# Patient Record
Sex: FEMALE | Race: WHITE
[De-identification: names, ages, dates, MRNs, and addresses within clinical notes are randomized per-mention and may not be internally consistent; named-entity substitution may affect disease eponyms.]

---

## 2017-01-27 ENCOUNTER — HOSPITAL ENCOUNTER (EMERGENCY)
Dept: HOSPITAL 17 - NEPC | Age: 27
Discharge: HOME | End: 2017-01-27
Payer: COMMERCIAL

## 2017-01-27 VITALS — HEIGHT: 66 IN | BODY MASS INDEX: 44.29 KG/M2 | WEIGHT: 275.58 LBS

## 2017-01-27 VITALS
DIASTOLIC BLOOD PRESSURE: 84 MMHG | TEMPERATURE: 98 F | RESPIRATION RATE: 15 BRPM | HEART RATE: 84 BPM | OXYGEN SATURATION: 98 % | SYSTOLIC BLOOD PRESSURE: 164 MMHG

## 2017-01-27 DIAGNOSIS — Z86.59: ICD-10-CM

## 2017-01-27 DIAGNOSIS — Z87.19: ICD-10-CM

## 2017-01-27 DIAGNOSIS — Z86.718: ICD-10-CM

## 2017-01-27 DIAGNOSIS — Z86.2: ICD-10-CM

## 2017-01-27 DIAGNOSIS — Z79.01: ICD-10-CM
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
## 2017-01-27 DIAGNOSIS — Z87.42: ICD-10-CM

## 2017-01-27 DIAGNOSIS — Z87.442: ICD-10-CM

## 2017-01-27 DIAGNOSIS — M62.830: Primary | ICD-10-CM

## 2017-01-27 DIAGNOSIS — Z87.39: ICD-10-CM

## 2017-01-27 DIAGNOSIS — Z86.79: ICD-10-CM

## 2017-01-27 DIAGNOSIS — Z87.09: ICD-10-CM

## 2017-01-27 PROCEDURE — 93005 ELECTROCARDIOGRAM TRACING: CPT

## 2017-01-27 PROCEDURE — 71020: CPT

## 2017-01-27 NOTE — EKG
Date Performed: 01/27/2017       Time Performed: 09:27:03

 

PTAGE:      26 years

 

EKG:      Sinus rhythm 

 

 NORMAL ECG

 

PREVIOUS TRACING       : 01/23/2014 22.03 Compared to prior tracing no significant change

 

DOCTOR:   Kuldeep Chapa  Interpretating Date/Time  01/27/2017 16:11:27
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
## 2017-03-25 ENCOUNTER — HOSPITAL ENCOUNTER (EMERGENCY)
Dept: HOSPITAL 17 - NEPC | Age: 27
Discharge: HOME | End: 2017-03-25
Payer: MEDICAID

## 2017-03-25 VITALS
SYSTOLIC BLOOD PRESSURE: 140 MMHG | OXYGEN SATURATION: 98 % | HEART RATE: 78 BPM | DIASTOLIC BLOOD PRESSURE: 94 MMHG | TEMPERATURE: 98.2 F | RESPIRATION RATE: 16 BRPM

## 2017-03-25 VITALS — SYSTOLIC BLOOD PRESSURE: 110 MMHG | DIASTOLIC BLOOD PRESSURE: 55 MMHG

## 2017-03-25 VITALS — HEIGHT: 66.5 IN | WEIGHT: 271.17 LBS | BODY MASS INDEX: 43.07 KG/M2

## 2017-03-25 DIAGNOSIS — R51: ICD-10-CM

## 2017-03-25 DIAGNOSIS — Z86.79: ICD-10-CM

## 2017-03-25 DIAGNOSIS — Z86.718: ICD-10-CM

## 2017-03-25 DIAGNOSIS — Z87.448: ICD-10-CM

## 2017-03-25 DIAGNOSIS — Z87.39: ICD-10-CM

## 2017-03-25 DIAGNOSIS — J40: Primary | ICD-10-CM

## 2017-03-25 DIAGNOSIS — Z86.2: ICD-10-CM

## 2017-03-25 DIAGNOSIS — Z86.59: ICD-10-CM

## 2017-03-25 DIAGNOSIS — Z87.09: ICD-10-CM

## 2017-03-25 DIAGNOSIS — Z79.01: ICD-10-CM

## 2017-03-25 DIAGNOSIS — Z86.69: ICD-10-CM

## 2017-03-25 DIAGNOSIS — Z87.891: ICD-10-CM

## 2017-03-25 DIAGNOSIS — Z87.19: ICD-10-CM

## 2017-03-25 PROCEDURE — 71010: CPT

## 2017-03-25 PROCEDURE — 94664 DEMO&/EVAL PT USE INHALER: CPT

## 2017-03-25 PROCEDURE — 99283 EMERGENCY DEPT VISIT LOW MDM: CPT

## 2017-03-25 NOTE — RADRPT
EXAM DATE/TIME:  03/25/2017 22:52 

 

[facility] COMPARISON:     

No previous studies available for comparison.

 

                     

INDICATIONS :     

Shortness of breath.

                     

 

MEDICAL HISTORY :            

Asthma   

 

SURGICAL HISTORY :     

None.   

 

ENCOUNTER:     

Initial                                        

 

ACUITY:     

1 day      

 

PAIN SCORE:     

0/10

 

LOCATION:     

Bilateral  chest

 

FINDINGS:     

A single view of the chest demonstrates the lungs to be symmetrically aerated without evidence of mas
s, infiltrate or effusion.  The cardiomediastinal contours are unremarkable.  Osseous structures are 
intact.

 

CONCLUSION:     Normal examination.  

 

 

 

 Jesús Pickett Jr., MD on March 25, 2017 at 23:04           

Board Certified Radiologist.

 This report was verified electronically.

## 2017-03-25 NOTE — PD
HPI


Chief Complaint:  Respiratory Symptoms


Time Seen by Provider:  22:28


Travel History


International Travel<30 days:  No


Contact w/Intl Traveler<30days:  No


Traveled to known affect area:  No





History of Present Illness


HPI


26-year-old female complains of coughing and shortness of breath.  Patient has 

history of asthma and has been using inhaler at home.  Patient states the cough 

is persistent cough and nonproductive.  Patient states that she has mild aching 

headache.  Patient denies earaches or sore throat.  Patient denies any chest 

pain.  Patient denies abdominal pain.  Patient denies any nausea vomiting 

diarrhea.  Patient denies any fever chills.





PFSH


Past Medical History


Hx Anticoagulant Therapy:  Yes (FACTOR 5)


ADHD:  Yes


Anemia:  Yes


Arthritis:  Yes (RA IN RIGHT LEG AND HIP)


Asthma:  Yes


Blood Disorders:  Yes (Factor V , protein deficiency )


Anxiety:  Yes


Depression:  Yes


Heart Rhythm Problems:  Yes (HEART MURMUR)


Cardiovascular Problems:  Yes (MURMUR)


Chest Pain:  Yes


Diminished Hearing:  No


Deep Vein Thrombosis:  Yes (LLE)


Gastrointestinal Disorders:  Yes


GERD:  Yes


Headaches:  Yes


Kidney Stones:  Yes


Musculoskeletal:  Yes (CHRONIC BACK PAIN)


Psychiatric:  Yes (PANIC DISORDER)


Respiratory:  Yes (PE)


Immunizations Current:  Yes


Migraines:  Yes


Pneumonia:  Yes


Ulcer:  Yes


PNEUMOCCOCAL Vaccine (Year):  1


Menopausal:  No


:  1


Para:  1


Miscarriage:  0


:  0


Ovarian Cysts:  Yes (tressa)


Tubal Ligation:  Yes





Past Surgical History


Gynecologic Surgery:  Yes (TUBAL)


Other Surgery:  Yes





Family History


Family Hypercholesterolemia:  Yes





Social History


Alcohol Use:  No (OCC)


Tobacco Use:  No (Q 6.5 YEARS AGO)


Substance Use:  No





Allergies-Medications


(Allergen,Severity, Reaction):  


Coded Allergies:  


     Ancef (Verified  Allergy, Severe, Flushing, ITCHING, HIVES, 3/25/17)


     Aspirin (Verified  Allergy, Severe, coumadin therapy, 3/25/17)


     Ibuprofen (Verified  Allergy, Severe, VOMITING, 3/25/17)


     Imitrex (Verified  Allergy, Severe, CHEST PAIN, 3/25/17)


     Lactose (Verified  Allergy, Severe, RASH ITCHING, 3/25/17)


     Lortab (Verified  Allergy, Severe, HIVES, VOMITING, 3/25/17)


     Molds and Smuts (Verified  Allergy, Severe, asthma attack, 3/25/17)


     Mushroom (Verified  Allergy, Severe, throat swells, rash, 3/25/17)


     Pineapple (Verified  Allergy, Severe, Rash, THROAT SWELLS, 3/25/17)


     Percocet (Verified  Allergy, Intermediate, rash, throat closed, asthmatic 

cough , 3/25/17)


Reported Meds & Prescriptions





Reported Meds & Active Scripts


Active


Prednisone 20 Mg Tab 20 Mg PO BID


Zithromax Z-Wilfredo (Azithromycin) 250 Mg Dspk 250 Mg PO AS DIRECTED


     500 MG (2 tabs) day 1, then 1 tab days 2-5.


Ibuprofen 800 Mg Tab 800 Mg PO Q6HR PRN


Flexeril (Cyclobenzaprine HCl) 10 Mg Tab 10 Mg PO TID


Propranolol (Propranolol HCl) 40 Mg Tab 40 Mg PO BID


Butalbital-Acetaminophen-Caffeine -40 Mg Cap 1 Cap PO DAILY PRN


     Do not exceed 6 capsules/day.


Jencycla-35 (Norethindrone) 0.35 Mg Tab 1 Tab PO DAILY


Proair Hfa 8.5 GM Inh (Albuterol Sulfate) 90 Mcg/Act Aer 2 Puff INH Q4-6H PRN


     108 mcg/actuation


Reported


Xarelto (Rivaroxaban) 10 Mg Tab 10 Mg PO DAILY


Hydroxyzine Pamoate 25 Mg Cap 25 Mg PO TID PRN


Tramadol (Tramadol HCl) 50 Mg Tab 50 Mg PO Q6H PRN


Escitalopram (Escitalopram Oxalate) 20 Mg Tab 20 Mg PO DAILY








Review of Systems


General / Constitutional:  No: Fever


Eyes:  No: Visual changes


HENT:  No: Headaches


Cardiovascular:  No: Chest Pain or Discomfort


Respiratory:  Positive: Cough, Shortness of Breath


Gastrointestinal:  No: Abdominal Pain


Genitourinary:  No: Dysuria


Musculoskeletal:  No: Pain


Skin:  No Rash


Neurologic:  No: Weakness


Psychiatric:  No: Depression


Endocrine:  No: Polydipsia


Hematologic/Lymphatic:  No: Easy Bruising





Physical Exam


Narrative


GENERAL: Well-nourished, well-developed patient.


SKIN: Warm and dry.


HEAD: Normocephalic.


EYES: No scleral icterus. No injection or drainage. 


NECK: Supple, trachea midline. No JVD or lymphadenopathy.


CARDIOVASCULAR: Regular rate and rhythm without murmurs, gallops, or rubs. 


RESPIRATORY: Breath sounds equal bilaterally. No accessory muscle use.


GASTROINTESTINAL: Abdomen soft, non-tender, nondistended. 


MUSCULOSKELETAL: No cyanosis, or edema. 


BACK: Nontender without obvious deformity. No CVA tenderness.





Data


Data


Last Documented VS





Vital Signs








  Date Time  Temp Pulse Resp B/P Pulse Ox O2 Delivery O2 Flow Rate FiO2


 


3/25/17 22:14 98.2 78 16 140/94 98 Room Air  








Orders





 Chest, Single Ap (3/25/17 22:40)


Albuterol-Ipratropium Neb (Duoneb Neb) (3/25/17 22:45)








MDM


Medical Decision Making


Medical Screen Exam Complete:  Yes


Emergency Medical Condition:  Yes


Interpretation(s)


23:06 PM.  Chest x-ray shows no acute consolidation.


Differential Diagnosis


Differential diagnosis including bronchitis, pneumonia, acute exacerbation of 

asthma.


Narrative Course


26-year-old female with coughing and shortness of breath.  History of asthma.  

Albuterol with Atrovent unit dose treatment 1.





Diagnosis





 Primary Impression:  


 Bronchitis


Patient Instructions:  General Instructions





***Additional Instructions:


Continue with albuterol inhaler as directed.  Z-Wilfredo as directed.  Prednisone as 

directed.  Follow-up with personal physician.  Return if worse.


***Med/Other Pt SpecificInfo:  Prescription(s) given


Scripts


Prednisone 20 Mg Tab20 Mg PO BID  #10 TAB


   Prov:Demetrio Bose MD         3/25/17 


Azithromycin (Zithromax Z-Wilfredo)250 Mg [id number] Mg PO AS DIRECTED  #1 DSPK


   500 MG (2 tabs) day 1, then 1 tab days 2-5.


   Prov:Demetrio Bose MD         3/25/17


Disposition:  01 DISCHARGE HOME


Condition:  Stable








Demetrio Bose MD Mar 25, 2017 22:48

## 2017-05-24 ENCOUNTER — HOSPITAL ENCOUNTER (EMERGENCY)
Dept: HOSPITAL 17 - NEPD | Age: 27
Discharge: HOME | End: 2017-05-24
Payer: MEDICAID

## 2017-05-24 VITALS — BODY MASS INDEX: 44.82 KG/M2 | WEIGHT: 278.88 LBS | HEIGHT: 66 IN

## 2017-05-24 VITALS
RESPIRATION RATE: 16 BRPM | TEMPERATURE: 98.4 F | HEART RATE: 71 BPM | DIASTOLIC BLOOD PRESSURE: 77 MMHG | OXYGEN SATURATION: 100 % | SYSTOLIC BLOOD PRESSURE: 164 MMHG

## 2017-05-24 DIAGNOSIS — R10.31: Primary | ICD-10-CM

## 2017-05-24 DIAGNOSIS — R19.7: ICD-10-CM

## 2017-05-24 DIAGNOSIS — R11.0: ICD-10-CM

## 2017-05-24 LAB
ANION GAP SERPL CALC-SCNC: 8 MEQ/L (ref 5–15)
BASOPHILS # BLD AUTO: 0.1 TH/MM3 (ref 0–0.2)
BASOPHILS NFR BLD: 0.7 % (ref 0–2)
BUN SERPL-MCNC: 14 MG/DL (ref 7–18)
CHLAMYDIA PCR: NOT DETECTED
CHLORIDE SERPL-SCNC: 109 MEQ/L (ref 98–107)
COLOR UR: YELLOW
COMMENT (UR): (no result)
CULTURE IF INDICATED: (no result)
EOSINOPHIL # BLD: 0.1 TH/MM3 (ref 0–0.4)
EOSINOPHIL NFR BLD: 1.2 % (ref 0–4)
ERYTHROCYTE [DISTWIDTH] IN BLOOD BY AUTOMATED COUNT: 13.1 % (ref 11.6–17.2)
GFR SERPLBLD BASED ON 1.73 SQ M-ARVRAT: 67 ML/MIN (ref 89–?)
GLUCOSE UR STRIP-MCNC: (no result) MG/DL
HCO3 BLD-SCNC: 25.4 MEQ/L (ref 21–32)
HCT VFR BLD CALC: 36.1 % (ref 35–46)
HEMO FLAGS: (no result)
HGB UR QL STRIP: (no result)
KETONES UR STRIP-MCNC: (no result) MG/DL
LYMPHOCYTES # BLD AUTO: 2.6 TH/MM3 (ref 1–4.8)
LYMPHOCYTES NFR BLD AUTO: 30.8 % (ref 9–44)
MCH RBC QN AUTO: 28.4 PG (ref 27–34)
MCHC RBC AUTO-ENTMCNC: 33.1 % (ref 32–36)
MCV RBC AUTO: 85.8 FL (ref 80–100)
MONOCYTES NFR BLD: 7 % (ref 0–8)
MUCOUS THREADS #/AREA URNS LPF: (no result) /LPF
NEISSERIA PCR: NOT DETECTED
NEUTROPHILS # BLD AUTO: 5.2 TH/MM3 (ref 1.8–7.7)
NEUTROPHILS NFR BLD AUTO: 60.3 % (ref 16–70)
NITRITE UR QL STRIP: (no result)
PLATELET # BLD: 269 TH/MM3 (ref 150–450)
POTASSIUM SERPL-SCNC: 3.5 MEQ/L (ref 3.5–5.1)
RBC # BLD AUTO: 4.2 MIL/MM3 (ref 4–5.3)
SODIUM SERPL-SCNC: 142 MEQ/L (ref 136–145)
SP GR UR STRIP: 1.01 (ref 1–1.03)
SQUAMOUS #/AREA URNS HPF: 1 /HPF (ref 0–5)
WBC # BLD AUTO: 8.6 TH/MM3 (ref 4–11)

## 2017-05-24 PROCEDURE — 81001 URINALYSIS AUTO W/SCOPE: CPT

## 2017-05-24 PROCEDURE — 87210 SMEAR WET MOUNT SALINE/INK: CPT

## 2017-05-24 PROCEDURE — 85025 COMPLETE CBC W/AUTO DIFF WBC: CPT

## 2017-05-24 PROCEDURE — 87491 CHLMYD TRACH DNA AMP PROBE: CPT

## 2017-05-24 PROCEDURE — 80048 BASIC METABOLIC PNL TOTAL CA: CPT

## 2017-05-24 PROCEDURE — 87591 N.GONORRHOEAE DNA AMP PROB: CPT

## 2017-05-24 PROCEDURE — 99284 EMERGENCY DEPT VISIT MOD MDM: CPT

## 2017-05-24 PROCEDURE — 84703 CHORIONIC GONADOTROPIN ASSAY: CPT

## 2017-05-24 NOTE — PD
HPI


Chief Complaint:  Abdominal Pain


Time Seen by Provider:  13:47


Travel History


International Travel<30 days:  No


Contact w/Intl Traveler<30days:  No


Traveled to known affect area:  No





History of Present Illness


HPI


26 year-old woman presents to the emergency room, abdominal pain.  States her 

in today she started having some nausea retching and some right lower quadrant 

abdominal pain.  Radiates throughout her lower abdomen.  Sharp.  She had one 

episode diarrhea.  She's had some retching but no real vomiting.  She sexual 

active with men and women, 3 partners in the past 6 months.  No abnormal 

vaginal discharge.  No other complaints.





History


Past Medical History


*** Narrative Medical


Thrombophilia with factor 5 Leiden deficiency and previous PE, on Xarelto


PNEUMOCCOCAL Vaccine (Year):  1


LMP:  17


Menopausal:  No


:  1


Para:  1





Social History


Alcohol Use:  No (OCC)


Tobacco Use:  No (Q 6.5 YEARS AGO)





Allergies-Medications


(Allergen,Severity, Reaction):  


Coded Allergies:  


     Ancef (Verified  Allergy, Severe, Flushing, ITCHING, HIVES, 17)


     Aspirin (Verified  Allergy, Severe, coumadin therapy, 17)


     Ibuprofen (Verified  Allergy, Severe, VOMITING, 17)


     Imitrex (Verified  Allergy, Severe, CHEST PAIN, 17)


     Lactose (Verified  Allergy, Severe, RASH ITCHING, 17)


     Lortab (Verified  Allergy, Severe, HIVES, VOMITING, 17)


     Molds and Smuts (Verified  Allergy, Severe, asthma attack, 17)


     Mushroom (Verified  Allergy, Severe, throat swells, rash, 17)


     Pineapple (Verified  Allergy, Severe, Rash, THROAT SWELLS, 17)


     Percocet (Verified  Allergy, Intermediate, rash, throat closed, asthmatic 

cough , 17)


Reported Meds & Prescriptions





Reported Meds & Active Scripts


Active


Hydrocodone-Acetaminophen  Tab 1 Tab PO Q6H PRN


Flexeril (Cyclobenzaprine HCl) 10 Mg Tab 10 Mg PO TID


Zofran (Ondansetron HCl) 8 Mg Tab 8 Mg PO TID


Propranolol (Propranolol HCl) 80 Mg Tab 80 Mg PO Q12HR


Butalbital-Acetaminophen-Caffeine -40 Mg Cap 1 Cap PO DAILY PRN


     Do not exceed 6 capsules/day.


Proair Hfa 8.5 GM Inh (Albuterol Sulfate) 90 Mcg/Act Aer 2 Puff INH Q4-6H PRN


     108 mcg/actuation


Reported


Abilify (Aripiprazole) 2 Mg Tab 2 Mg PO HS


Jencycla-35 (Norethindrone) 0.35 Mg Tab 1 Tab PO HS


Xarelto (Rivaroxaban) 10 Mg Tab 10 Mg PO HS


Hydroxyzine Pamoate 25 Mg Cap 25 Mg PO TID PRN


Escitalopram (Escitalopram Oxalate) 20 Mg Tab 20 Mg PO HS








Review of Systems


Except as stated in HPI:  all other systems reviewed are Neg





Physical Exam


Narrative


GENERAL: Obese 26 year-old woman, no acute distress.


SKIN: Focused skin assessment warm/dry.


CARDIOVASCULAR: Regular rate and rhythm.  No murmur appreciated.


RESPIRATORY: No accessory muscle use. Clear to auscultation. Breath sounds 

equal bilaterally. 


GASTROINTESTINAL: Abdomen obese and soft.  No significant tenderness.


MUSCULOSKELETAL: No edema.





PELVIC: Normal external female genitalia.  Scant white vaginal discharge.  

Cervix is normal.  Mild diffuse pelvic tenderness.





Data


Data


Last Documented VS





Vital Signs








  Date Time  Temp Pulse Resp B/P Pulse Ox O2 Delivery O2 Flow Rate FiO2


 


17 13:30 98.4 71 16 164/77 100 Room Air  








Orders





 Complete Blood Count With Diff (17 14:16)


Basic Metabolic Panel (Bmp) (17 14:16)


Urinalysis - C+S If Indicated (17 14:16)


Ed Urine Pregnancytest Poc (17 14:16)


Gc And Chlamydia Pcr (17 14:16)


Wet Prep Profile (17 14:16)





Labs





 Laboratory Tests








Test 17





 14:50 14:55 15:10


 


Urine Color YELLOW   


 


Urine Turbidity CLEAR   


 


Urine pH 5.0   


 


Urine Specific Gravity 1.008   


 


Urine Protein 30 mg/dL  


 


Urine Glucose (UA) NEG mg/dL  


 


Urine Ketones NEG mg/dL  


 


Urine Occult Blood TRACE   


 


Urine Nitrite NEG   


 


Urine Bilirubin NEG   


 


Urine Urobilinogen LESS THAN 2.0  





 MG/DL  


 


Urine Leukocyte Esterase NEG   


 


Urine RBC 1 /hpf  


 


Urine WBC 2 /hpf  


 


Urine Squamous Epithelial 1 /hpf  





Cells   


 


Urine Mucus FEW /lpf  


 


Microscopic Urinalysis Comment CULT NOT  





 INDICATED  


 


Clue Cells (Wet Prep)  NONE SEEN  


 


Vaginal Trichomonas (Wet Prep)  NONE SEEN  


 


Vaginal Yeast (Wet Prep)  NONE SEEN  


 


White Blood Count   8.6 TH/MM3


 


Red Blood Count   4.20 MIL/MM3


 


Hemoglobin   11.9 GM/DL


 


Hematocrit   36.1 %


 


Mean Corpuscular Volume   85.8 FL


 


Mean Corpuscular Hemoglobin   28.4 PG


 


Mean Corpuscular Hemoglobin   33.1 %





Concent   


 


Red Cell Distribution Width   13.1 %


 


Platelet Count   269 TH/MM3


 


Mean Platelet Volume   8.1 FL


 


Neutrophils (%) (Auto)   60.3 %


 


Lymphocytes (%) (Auto)   30.8 %


 


Monocytes (%) (Auto)   7.0 %


 


Eosinophils (%) (Auto)   1.2 %


 


Basophils (%) (Auto)   0.7 %


 


Neutrophils # (Auto)   5.2 TH/MM3


 


Lymphocytes # (Auto)   2.6 TH/MM3


 


Monocytes # (Auto)   0.6 TH/MM3


 


Eosinophils # (Auto)   0.1 TH/MM3


 


Basophils # (Auto)   0.1 TH/MM3


 


CBC Comment   DIFF FINAL 


 


Differential Comment    


 


Sodium Level   142 MEQ/L


 


Potassium Level   3.5 MEQ/L


 


Chloride Level   109 MEQ/L


 


Carbon Dioxide Level   25.4 MEQ/L


 


Anion Gap   8 MEQ/L


 


Blood Urea Nitrogen   14 MG/DL


 


Creatinine   1.00 MG/DL


 


Estimat Glomerular Filtration   67 ML/MIN





Rate   


 


Random Glucose   79 MG/DL


 


Calcium Level   8.6 MG/DL











[facility]


Medical Decision Making


Medical Screen Exam Complete:  Yes


Emergency Medical Condition:  Yes


Interpretation(s)


LABS:


CBC is unremarkable.


BMP is unremarkable.


UA is unremarkable.


Wet preps negative


GC chlamydia is on pending


Differential Diagnosis


Pelvic pain, ovarian cyst, appendicitis, PID, other


Narrative Course


Medical decision making





26 year-old woman presents emergent problem with lower abdominal pain, nausea, 

retching, some diarrhea.  She looks well.  Pelvic exams unremarkable.  We'll 

check screening labs.  Recommended supportive treatment.  I don't think that 

she has PID, appendicitis, or other need for emergency treatment.





Diagnosis





 Primary Impression:  


 Abdominal pain





***Additional Instructions:


Use acetaminophen as needed for pain.





Follow-up with her primary doctor the next 2-4 days.





Return to the emergency department for any new or worsening symptoms.


***Med/Other Pt SpecificInfo:  Prescription(s) given


Disposition:  01 DISCHARGE HOME


Condition:  Stable








Noel Lundy MD May 24, 2017 15:57

## 2017-08-23 ENCOUNTER — HOSPITAL ENCOUNTER (EMERGENCY)
Dept: HOSPITAL 17 - NEPE | Age: 27
Discharge: HOME | End: 2017-08-23
Payer: MEDICAID

## 2017-08-23 VITALS
HEART RATE: 103 BPM | DIASTOLIC BLOOD PRESSURE: 86 MMHG | SYSTOLIC BLOOD PRESSURE: 194 MMHG | RESPIRATION RATE: 16 BRPM | OXYGEN SATURATION: 99 % | TEMPERATURE: 99.8 F

## 2017-08-23 VITALS
OXYGEN SATURATION: 99 % | DIASTOLIC BLOOD PRESSURE: 67 MMHG | SYSTOLIC BLOOD PRESSURE: 140 MMHG | RESPIRATION RATE: 16 BRPM | HEART RATE: 103 BPM

## 2017-08-23 VITALS — HEIGHT: 67 IN | BODY MASS INDEX: 43.25 KG/M2 | WEIGHT: 275.58 LBS

## 2017-08-23 VITALS — OXYGEN SATURATION: 99 %

## 2017-08-23 VITALS — RESPIRATION RATE: 16 BRPM

## 2017-08-23 DIAGNOSIS — K44.9: ICD-10-CM

## 2017-08-23 DIAGNOSIS — R10.31: Primary | ICD-10-CM

## 2017-08-23 LAB
ALP SERPL-CCNC: 68 U/L (ref 45–117)
ALT SERPL-CCNC: 27 U/L (ref 10–53)
ANION GAP SERPL CALC-SCNC: 8 MEQ/L (ref 5–15)
AST SERPL-CCNC: 22 U/L (ref 15–37)
BACTERIA #/AREA URNS HPF: (no result) /HPF
BASOPHILS # BLD AUTO: 0.1 TH/MM3 (ref 0–0.2)
BASOPHILS NFR BLD: 0.6 % (ref 0–2)
BILIRUB SERPL-MCNC: 0.3 MG/DL (ref 0.2–1)
BUN SERPL-MCNC: 11 MG/DL (ref 7–18)
CHLORIDE SERPL-SCNC: 108 MEQ/L (ref 98–107)
COLOR UR: YELLOW
COMMENT (UR): (no result)
CULTURE IF INDICATED: (no result)
EOSINOPHIL # BLD: 0.2 TH/MM3 (ref 0–0.4)
EOSINOPHIL NFR BLD: 1.7 % (ref 0–4)
ERYTHROCYTE [DISTWIDTH] IN BLOOD BY AUTOMATED COUNT: 13 % (ref 11.6–17.2)
GFR SERPLBLD BASED ON 1.73 SQ M-ARVRAT: 69 ML/MIN (ref 89–?)
GLUCOSE UR STRIP-MCNC: (no result) MG/DL
HCO3 BLD-SCNC: 24.4 MEQ/L (ref 21–32)
HCT VFR BLD CALC: 36.4 % (ref 35–46)
HEMO FLAGS: (no result)
HGB UR QL STRIP: (no result)
KETONES UR STRIP-MCNC: (no result) MG/DL
LYMPHOCYTES # BLD AUTO: 2.1 TH/MM3 (ref 1–4.8)
LYMPHOCYTES NFR BLD AUTO: 17.4 % (ref 9–44)
MCH RBC QN AUTO: 28.7 PG (ref 27–34)
MCHC RBC AUTO-ENTMCNC: 32.8 % (ref 32–36)
MCV RBC AUTO: 87.3 FL (ref 80–100)
MONOCYTES NFR BLD: 6.7 % (ref 0–8)
NEUTROPHILS # BLD AUTO: 8.7 TH/MM3 (ref 1.8–7.7)
NEUTROPHILS NFR BLD AUTO: 73.6 % (ref 16–70)
NITRITE UR QL STRIP: (no result)
PLATELET # BLD: 263 TH/MM3 (ref 150–450)
POTASSIUM SERPL-SCNC: 3.5 MEQ/L (ref 3.5–5.1)
RBC # BLD AUTO: 4.16 MIL/MM3 (ref 4–5.3)
SODIUM SERPL-SCNC: 140 MEQ/L (ref 136–145)
SP GR UR STRIP: 1.02 (ref 1–1.03)
SQUAMOUS #/AREA URNS HPF: 1 /HPF (ref 0–5)
WBC # BLD AUTO: 11.9 TH/MM3 (ref 4–11)

## 2017-08-23 PROCEDURE — 81001 URINALYSIS AUTO W/SCOPE: CPT

## 2017-08-23 PROCEDURE — 96375 TX/PRO/DX INJ NEW DRUG ADDON: CPT

## 2017-08-23 PROCEDURE — 84703 CHORIONIC GONADOTROPIN ASSAY: CPT

## 2017-08-23 PROCEDURE — 85025 COMPLETE CBC W/AUTO DIFF WBC: CPT

## 2017-08-23 PROCEDURE — 96374 THER/PROPH/DIAG INJ IV PUSH: CPT

## 2017-08-23 PROCEDURE — 99285 EMERGENCY DEPT VISIT HI MDM: CPT

## 2017-08-23 PROCEDURE — 74177 CT ABD & PELVIS W/CONTRAST: CPT

## 2017-08-23 PROCEDURE — 96376 TX/PRO/DX INJ SAME DRUG ADON: CPT

## 2017-08-23 PROCEDURE — 96361 HYDRATE IV INFUSION ADD-ON: CPT

## 2017-08-23 PROCEDURE — 83690 ASSAY OF LIPASE: CPT

## 2017-08-23 PROCEDURE — 80053 COMPREHEN METABOLIC PANEL: CPT

## 2017-08-23 NOTE — RADRPT
EXAM DATE/TIME:  08/23/2017 02:43 

 

[facility] COMPARISON:     

No previous studies available for comparison.

 

 

INDICATIONS :     

Right lower quadrant abdomen pain with vaginal bleeding.

                      

 

IV CONTRAST:     

100 cc Omnipaque 350 (iohexol) IV 

 

 

ORAL CONTRAST:      

No oral contrast ingested.

                      

 

RADIATION DOSE:     

18.94 CTDIvol (mGy) 

 

 

MEDICAL HISTORY :     

Cardiovascular disease.  

 

SURGICAL HISTORY :      

Tubal ligation. 

 

ENCOUNTER:      

Initial

 

ACUITY:      

2 weeks

 

PAIN SCALE:      

8/10

 

LOCATION:       

Right lower quadrant abdomen

 

TECHNIQUE:     

Volumetric scanning of the abdomen and pelvis was performed.  Using automated exposure control and ad
justment of the mA and/or kV according to patient size, radiation dose was kept as low as reasonably 
achievable to obtain optimal diagnostic quality images.  DICOM format image data is available electro
nically for review and comparison.  

 

FINDINGS:     

Lung bases are clear. No focal abnormality in the liver, spleen, adrenals, kidneys or pancreas. No ca
lcified gallstones or biliary ductal dilatation. Appendix has normal caliber. Intrauterine device pre
sent. No adnexal mass or free fluid.

 

No bowel obstruction. No free air. Bones intact.

 

CONCLUSION:     

No acute findings. Intrauterine device present. Appendix appears normal. Small hiatal hernia. No obst
ructive uropathy. 

 

 

 Arash March MD on August 23, 2017 at 3:04           

Board Certified Radiologist.

 This report was verified electronically.

## 2017-08-23 NOTE — PD
HPI


Chief Complaint:  Abdominal Pain


Time Seen by Provider:  01:34


Travel History


International Travel<30 days:  No


Contact w/Intl Traveler<30days:  No


Traveled to known affect area:  No





History of Present Illness


HPI


The patient is a 27-year-old  female who presents to the emergency 

department for abdominal pain.  The patient states she developed abdominal pain 

approximately midnight.  The abdominal pain initially was periumbilical, now 

radiates to the right lower quadrant into the right lower aspect of the back.  

She does complain of mild nausea but denies any vomiting.  The patient denies 

any fever, but does complain of chills and sweats.  The patient is unsure if 

she is had any hematuria as she's had vaginal bleeding for the last 5 weeks.  

The patient is on Xarelto for history of factor V Leiden Deficiency and protein 

S deficiency with history of DVT and pulmonary embolism.  The patient recently 

had a Mirena IUD placed and was referred by her primary physician at [address] [place] [address] to gynecologist for follow-up in regards to her vaginal bleeding.  The 

patient does have a history of previous tubal ligation, denies any previous 

history of appendicitis or cholecystitis.  She denies any dysuria.  Symptoms 

are moderate without any alleviating or exacerbating factors.





PFSH


Past Medical History


Hx Anticoagulant Therapy:  Yes (xarelto)


ADHD:  Yes


Anemia:  Yes


Arthritis:  Yes (RA IN RIGHT LEG AND HIP)


Asthma:  Yes


Blood Disorders:  Yes (Factor V , protein deficiency )


Anxiety:  Yes


Depression:  Yes


Heart Rhythm Problems:  Yes (HEART MURMUR)


Cardiovascular Problems:  Yes (MURMUR)


Chest Pain:  Yes


Diminished Hearing:  No


Deep Vein Thrombosis:  Yes (LLE)


Gastrointestinal Disorders:  Yes


GERD:  Yes


Headaches:  Yes


Kidney Stones:  Yes


Musculoskeletal:  Yes (CHRONIC BACK PAIN)


Psychiatric:  Yes (PANIC DISORDER)


Respiratory:  Yes (PE)


Immunizations Current:  Yes


Migraines:  Yes


Pneumonia:  Yes


Ulcer:  Yes


Influenza Vaccination:  Yes


PNEUMOCCOCAL Vaccine (Year):  1


Pregnant?:  Not Pregnant


Menopausal:  No


:  1


Para:  1


Miscarriage:  0


:  0


Ovarian Cysts:  Yes (tressa)


Tubal Ligation:  Yes





Past Surgical History


Gynecologic Surgery:  Yes (TUBAL)


Other Surgery:  Yes





Family History


Family Hypercholesterolemia:  Yes





Social History


Alcohol Use:  No


Tobacco Use:  No


Substance Use:  No





Allergies-Medications


(Allergen,Severity, Reaction):  


Coded Allergies:  


     aspirin (Unverified  Allergy, Severe, coumadin therapy, 8/15/17)


     cefazolin (Unverified  Allergy, Severe, Flushing, ITCHING, HIVES, 8/15/17)


     hydrocodone (Unverified  Allergy, Severe, HIVES, VOMITING, 8/15/17)


     ibuprofen (Unverified  Allergy, Severe, VOMITING, 8/15/17)


     lactose (Unverified  Allergy, Severe, RASH ITCHING, 8/15/17)


     mold (Unverified  Allergy, Severe, asthma attack, 8/15/17)


     mushroom (Unverified  Allergy, Severe, throat swells, rash, 8/15/17)


     pineapple (Unverified  Allergy, Severe, Rash, THROAT SWELLS, 8/15/17)


     sumatriptan (Unverified  Allergy, Severe, CHEST PAIN, 8/15/17)


     acetaminophen (Unverified  Allergy, Intermediate, rash, throat closed, 

asthmatic cough , 8/15/17)


     oxycodone (Unverified  Allergy, Intermediate, rash, throat closed, 

asthmatic cough , 8/15/17)


Reported Meds & Prescriptions





Reported Meds & Active Scripts


Active


Hydrocodone-Acetaminophen 5-325 mg Tab 1 Tab PO Q6H PRN


Flexeril (Cyclobenzaprine HCl) 10 Mg Tab 10 Mg PO TID


Proair Hfa 8.5 GM Inh (Albuterol Sulfate) 90 Mcg/Act Aer 2 Puff INH Q4-6H PRN


     108 mcg/actuation


Reported


Lexapro (Escitalopram Oxalate) 20 Mg Tab 20 Mg PO DAILY


Xarelto (Rivaroxaban) 10 Mg Tab 10 Mg PO HS








Review of Systems


Except as stated in HPI:  all other systems reviewed are Neg


General / Constitutional:  Positive: Chills, No: Fever


Cardiovascular:  No: Chest Pain or Discomfort


Respiratory:  No: Shortness of Breath


Gastrointestinal:  Positive: Nausea, Abdominal Pain, No: Vomiting, Diarrhea


Genitourinary:  Positive: Vaginal Bleeding, No: Dysuria





Physical Exam


Narrative


GENERAL: Awake, alert, nontoxic-appearing 27-year-old female who appears her 

stated age and is in no acute respiratory distress.


SKIN: Focused skin assessment warm/dry.


HEAD: Atraumatic. Normocephalic. 


EYES: Pupils equal and round. No scleral icterus. No injection or drainage. 


ENT: No nasal bleeding or discharge.  Mucous membranes pink and moist.


NECK: Trachea midline. No JVD. 


CARDIOVASCULAR: Regular, tachycardic with a heart rate of 100.


RESPIRATORY: No accessory muscle use. Clear to auscultation. Breath sounds 

equal bilaterally. 


GASTROINTESTINAL: Abdomen soft, obese, tender to palpation right lower 

quadrant.  No guarding or rigidity noted.


MUSCULOSKELETAL: No obvious deformities. No clubbing.  No cyanosis.  No edema. 


NEUROLOGICAL: Awake and alert. No obvious cranial nerve deficits.  Motor 

grossly within normal limits. Normal speech.


PSYCHIATRIC: Appropriate mood and affect; insight and judgment normal.





Data


Data


Last Documented VS





Vital Signs








  Date Time  Temp Pulse Resp B/P (MAP) Pulse Ox O2 Delivery O2 Flow Rate FiO2


 


17 02:24     99 Room Air  


 


17 01:26  103 16     


 


17 01:12 99.8       








Orders





 Orders


Complete Blood Count With Diff (17 01:40)


Comprehensive Metabolic Panel (17 01:40)


Lipase (17 01:40)


Urinalysis - C+S If Indicated (17 01:40)


Iv Access Insert/Monitor (17 01:40)


Ecg Monitoring (17 01:40)


Oximetry (17 01:40)


Morphine Inj (Morphine Inj) (17 01:45)


Ondansetron Inj (Zofran Inj) (17 01:45)


Sodium Chlor 0.9% 1000 Ml Inj (Ns 1000 M (17 01:40)


Sodium Chloride 0.9% Flush (Ns Flush) (17 01:45)


Ed Urine Pregnancytest Poc (17 01:40)


Ct Abd/Pel W Iv Contrast(Rout) (17 01:40)


Iohexol 350 Inj (Omnipaque 350 Inj) (17 02:50)





Labs





Laboratory Tests








Test


  17


01:50


 


White Blood Count 11.9 TH/MM3 


 


Red Blood Count 4.16 MIL/MM3 


 


Hemoglobin 11.9 GM/DL 


 


Hematocrit 36.4 % 


 


Mean Corpuscular Volume 87.3 FL 


 


Mean Corpuscular Hemoglobin 28.7 PG 


 


Mean Corpuscular Hemoglobin


Concent 32.8 % 


 


 


Red Cell Distribution Width 13.0 % 


 


Platelet Count 263 TH/MM3 


 


Mean Platelet Volume 8.3 FL 


 


Neutrophils (%) (Auto) 73.6 % 


 


Lymphocytes (%) (Auto) 17.4 % 


 


Monocytes (%) (Auto) 6.7 % 


 


Eosinophils (%) (Auto) 1.7 % 


 


Basophils (%) (Auto) 0.6 % 


 


Neutrophils # (Auto) 8.7 TH/MM3 


 


Lymphocytes # (Auto) 2.1 TH/MM3 


 


Monocytes # (Auto) 0.8 TH/MM3 


 


Eosinophils # (Auto) 0.2 TH/MM3 


 


Basophils # (Auto) 0.1 TH/MM3 


 


CBC Comment DIFF FINAL 


 


Differential Comment  


 


Urine Color YELLOW 


 


Urine Turbidity CLEAR 


 


Urine pH 5.5 


 


Urine Specific Gravity 1.021 


 


Urine Protein NEG mg/dL 


 


Urine Glucose (UA) NEG mg/dL 


 


Urine Ketones NEG mg/dL 


 


Urine Occult Blood SMALL 


 


Urine Nitrite NEG 


 


Urine Bilirubin NEG 


 


Urine Urobilinogen


  LESS THAN 2.0


MG/DL


 


Urine Leukocyte Esterase NEG 


 


Urine RBC


  LESS THAN 1


/hpf


 


Urine WBC 1 /hpf 


 


Urine Squamous Epithelial


Cells 1 /hpf 


 


 


Urine Bacteria RARE /hpf 


 


Microscopic Urinalysis Comment


  CULT NOT


INDICATED


 


Blood Urea Nitrogen 11 MG/DL 


 


Creatinine 0.96 MG/DL 


 


Random Glucose 83 MG/DL 


 


Total Protein 7.4 GM/DL 


 


Albumin 3.4 GM/DL 


 


Calcium Level 8.4 MG/DL 


 


Alkaline Phosphatase 68 U/L 


 


Aspartate Amino Transf


(AST/SGOT) 22 U/L 


 


 


Alanine Aminotransferase


(ALT/SGPT) 27 U/L 


 


 


Total Bilirubin 0.3 MG/DL 


 


Sodium Level 140 MEQ/L 


 


Potassium Level 3.5 MEQ/L 


 


Chloride Level 108 MEQ/L 


 


Carbon Dioxide Level 24.4 MEQ/L 


 


Anion Gap 8 MEQ/L 


 


Estimat Glomerular Filtration


Rate 69 ML/MIN 


 


 


Lipase 148 U/L 











[facility]


Medical Decision Making


Medical Screen Exam Complete:  Yes


Emergency Medical Condition:  Yes


Medical Record Reviewed:  Yes


Interpretation(s)


CT of the abdomen and pelvis reveals no acute findings.  Intrauterine device 

present.  Appendix appears normal.  Small hiatal hernia.  No obstructive 

uropathy.


Laboratory Tests








Test


  17


01:50


 


White Blood Count 11.9 TH/MM3 


 


Red Blood Count 4.16 MIL/MM3 


 


Hemoglobin 11.9 GM/DL 


 


Hematocrit 36.4 % 


 


Mean Corpuscular Volume 87.3 FL 


 


Mean Corpuscular Hemoglobin 28.7 PG 


 


Mean Corpuscular Hemoglobin


Concent 32.8 % 


 


 


Red Cell Distribution Width 13.0 % 


 


Platelet Count 263 TH/MM3 


 


Mean Platelet Volume 8.3 FL 


 


Neutrophils (%) (Auto) 73.6 % 


 


Lymphocytes (%) (Auto) 17.4 % 


 


Monocytes (%) (Auto) 6.7 % 


 


Eosinophils (%) (Auto) 1.7 % 


 


Basophils (%) (Auto) 0.6 % 


 


Neutrophils # (Auto) 8.7 TH/MM3 


 


Lymphocytes # (Auto) 2.1 TH/MM3 


 


Monocytes # (Auto) 0.8 TH/MM3 


 


Eosinophils # (Auto) 0.2 TH/MM3 


 


Basophils # (Auto) 0.1 TH/MM3 


 


CBC Comment DIFF FINAL 


 


Differential Comment  


 


Urine Color YELLOW 


 


Urine Turbidity CLEAR 


 


Urine pH 5.5 


 


Urine Specific Gravity 1.021 


 


Urine Protein NEG mg/dL 


 


Urine Glucose (UA) NEG mg/dL 


 


Urine Ketones NEG mg/dL 


 


Urine Occult Blood SMALL 


 


Urine Nitrite NEG 


 


Urine Bilirubin NEG 


 


Urine Urobilinogen


  LESS THAN 2.0


MG/DL


 


Urine Leukocyte Esterase NEG 


 


Urine RBC


  LESS THAN 1


/hpf


 


Urine WBC 1 /hpf 


 


Urine Squamous Epithelial


Cells 1 /hpf 


 


 


Urine Bacteria RARE /hpf 


 


Microscopic Urinalysis Comment


  CULT NOT


INDICATED


 


Blood Urea Nitrogen 11 MG/DL 


 


Creatinine 0.96 MG/DL 


 


Random Glucose 83 MG/DL 


 


Total Protein 7.4 GM/DL 


 


Albumin 3.4 GM/DL 


 


Calcium Level 8.4 MG/DL 


 


Alkaline Phosphatase 68 U/L 


 


Aspartate Amino Transf


(AST/SGOT) 22 U/L 


 


 


Alanine Aminotransferase


(ALT/SGPT) 27 U/L 


 


 


Total Bilirubin 0.3 MG/DL 


 


Sodium Level 140 MEQ/L 


 


Potassium Level 3.5 MEQ/L 


 


Chloride Level 108 MEQ/L 


 


Carbon Dioxide Level 24.4 MEQ/L 


 


Anion Gap 8 MEQ/L 


 


Estimat Glomerular Filtration


Rate 69 ML/MIN 


 


 


Lipase 148 U/L 








Differential Diagnosis


Differential diagnosis includes appendicitis, ovarian cyst, ovarian torsion, 

nephrolithiasis, pyelonephritis, pancreatitis, gastroenteritis.


Narrative Course


IV was established, labs were drawn and sent, and the patient was placed on 

cardiac telemetry monitoring and continuous pulse oximetry monitoring.  The 

patient was a  morphine, Zofran, and IV fluids for her symptoms.  

Noncontrast CT of the abdomen and pelvis was ordered to evaluate for 

appendicitis.  White count was minimally elevated 11.9, otherwise labs are 

unremarkable.  CT of the abdomen and pelvis reveals no acute findings, 

intrauterine device was present, appendix appears normal, small hiatal hernia, 

and no obstructive uropathy.  The patient was reevaluated at 3:12 AM.  The 

patient's nausea had resolved, she still had mild pain, but the pain had 

improved.  The patient wasn't ministered a smaller dose of morphine elbow be 

discharged home on Zofran and hydrocodone as needed.  She is advised to follow-

up with her primary physician and return if symptoms worsen or progress.  The 

patient states she is able to take Lortab without difficulty.





Diagnosis





 Primary Impression:  


 Abdominal pain


 Qualified Codes:  R10.31 - Right lower quadrant pain


Patient Instructions:  General Instructions





***Additional Instructions:  


Medications as directed.  Clear liquid diet and advance as tolerated.  Return 

if symptoms worsen or progress.


***Med/Other Pt SpecificInfo:  Prescription(s) given


Scripts


Ondansetron Odt (Zofran Odt) 4 Mg Tab


4 MG SL Q6HR Y for Nausea/Vomiting, #7 TAB 0 Refills


   Prov: Luis Vivar MD         17 


Hydrocodone-Acetaminophen (Norco) 5-325 mg Tab


1 TAB PO Q6H Y for PAIN, #10 TAB 0 Refills


   Prov: Luis Vivar MD         17


Disposition:   DISCHARGE HOME


Condition:  Stable











Luis Vivra MD Aug 23, 2017 01:52

## 2017-11-05 ENCOUNTER — HOSPITAL ENCOUNTER (EMERGENCY)
Dept: HOSPITAL 17 - NEPD | Age: 27
Discharge: HOME | End: 2017-11-05
Payer: MEDICAID

## 2017-11-05 VITALS
OXYGEN SATURATION: 100 % | RESPIRATION RATE: 14 BRPM | SYSTOLIC BLOOD PRESSURE: 162 MMHG | DIASTOLIC BLOOD PRESSURE: 113 MMHG | TEMPERATURE: 98.7 F | HEART RATE: 70 BPM

## 2017-11-05 VITALS — WEIGHT: 279.44 LBS | BODY MASS INDEX: 43.86 KG/M2 | HEIGHT: 67 IN

## 2017-11-05 DIAGNOSIS — M06.9: ICD-10-CM

## 2017-11-05 DIAGNOSIS — K21.9: ICD-10-CM

## 2017-11-05 DIAGNOSIS — F90.9: ICD-10-CM

## 2017-11-05 DIAGNOSIS — J45.909: ICD-10-CM

## 2017-11-05 DIAGNOSIS — S96.911A: Primary | ICD-10-CM

## 2017-11-05 DIAGNOSIS — S93.401A: ICD-10-CM
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
## 2017-11-05 DIAGNOSIS — D64.9: ICD-10-CM

## 2017-11-05 DIAGNOSIS — R73.03: ICD-10-CM

## 2017-11-05 PROCEDURE — 73620 X-RAY EXAM OF FOOT: CPT

## 2017-11-05 PROCEDURE — 73600 X-RAY EXAM OF ANKLE: CPT

## 2017-11-05 PROCEDURE — E0113 CRUTCH UNDERARM EACH WOOD: HCPCS

## 2017-11-05 PROCEDURE — 99283 EMERGENCY DEPT VISIT LOW MDM: CPT

## 2017-11-05 NOTE — PD
HPI


Chief Complaint:  Pain: Acute or Chronic


Time Seen by Provider:  10:30


Travel History


International Travel<30 days:  No


Contact w/Intl Traveler<30days:  No


Traveled to known affect area:  No





History of Present Illness


HPI


27-year-old female presents to the emergency department complaining of right 

ankle and foot pain for approximately 1 week. States she turned to step and 

felt moderate, sharp pains along the lateral and medial aspect of the foot and 

ankle. Pain is reduced with rest. She has not used OTC medications or wraps for 

symptom relief. She started new job at [employer] in which she stands 

for several hours at a time and believes this may be why she has increased 

pain. She denies numbness, tingling, radiation of pain, weakness, crepitus. She 

has a history of 'fallen arches' and was previously placed in a boot for a 

couple of months. She has not followed up with her podiatrist. She otherwise 

does not have chronic medical conditions nor take chronic medications.





PFSH


Past Medical History


Hx Anticoagulant Therapy:  Yes


ADHD:  Yes


Anemia:  Yes


Arthritis:  Yes (RA IN RIGHT LEG AND HIP)


Asthma:  Yes


Blood Disorders:  Yes (Factor V , protein deficiency )


Anxiety:  Yes


Depression:  Yes


Heart Rhythm Problems:  Yes (HEART MURMUR)


Cardiovascular Problems:  Yes (MURMUR)


Chest Pain:  Yes


Diabetes:  Yes (PRE)


Patient Takes Glucophage:  No


Diminished Hearing:  No


Deep Vein Thrombosis:  Yes (LLE)


Gastrointestinal Disorders:  Yes


GERD:  Yes


Headaches:  Yes


Kidney Stones:  Yes


Musculoskeletal:  Yes (CHRONIC BACK PAIN)


Psychiatric:  Yes (PANIC DISORDER)


Immunizations Current:  Yes


Migraines:  Yes


Pneumonia:  Yes


Ulcer:  Yes


PNEUMOCCOCAL Vaccine (Year):  1


Pregnant?:  Not Pregnant


LMP:  NEW MIRENA PLACED


Menopausal:  No


:  1


Para:  1


Miscarriage:  0


:  0


Ovarian Cysts:  Yes (tressa)


Tubal Ligation:  Yes





Past Surgical History


Gynecologic Surgery:  Yes (TUBAL)


Other Surgery:  Yes





Family History


Family Hypercholesterolemia:  Yes





Social History


Alcohol Use:  No


Tobacco Use:  No


Substance Use:  No





Allergies-Medications


(Allergen,Severity, Reaction):  


Coded Allergies:  


     aspirin (Unverified  Allergy, Severe, coumadin therapy, 17)


     cefazolin (Unverified  Allergy, Severe, Flushing, ITCHING, HIVES, 17)


     hydrocodone (Unverified  Allergy, Severe, HIVES, VOMITING, 17)


     ibuprofen (Unverified  Allergy, Severe, VOMITING, 17)


     lactose (Unverified  Allergy, Severe, RASH ITCHING, 17)


     mold (Unverified  Allergy, Severe, asthma attack, 17)


     mushroom (Unverified  Allergy, Severe, throat swells, rash, 17)


     pineapple (Unverified  Allergy, Severe, Rash, THROAT SWELLS, 17)


     sumatriptan (Unverified  Allergy, Severe, CHEST PAIN, 17)


     acetaminophen (Unverified  Allergy, Intermediate, rash, throat closed, 

asthmatic cough , 17)


     oxycodone (Unverified  Allergy, Intermediate, rash, throat closed, 

asthmatic cough , 17)


Reported Meds & Prescriptions





Reported Meds & Active Scripts


Active


Reglan (Metoclopramide HCl) 10 Mg Tab 10 Mg PO DAILY


Hydrocortisone-Pramoxine Rectal 1-1% Cream 1 Applic RECTAL QID PRN


Zofran Odt (Ondansetron Odt) 4 Mg Tab 4 Mg SL Q6HR PRN


Flexeril (Cyclobenzaprine HCl) 10 Mg Tab 10 Mg PO TID


Proair Hfa 8.5 GM Inh (Albuterol Sulfate) 90 Mcg/Act Aer 2 Puff INH Q4-6H PRN


     108 mcg/actuation


Reported


Propranolol (Propranolol HCl) 80 Mg Tab 80 Mg PO Q12HR


Abilify (Aripiprazole) 2 Mg Tab 2 Mg PO DAILY


Lexapro (Escitalopram Oxalate) 20 Mg Tab 20 Mg PO DAILY


Xarelto (Rivaroxaban) 10 Mg Tab 10 Mg PO HS








Review of Systems


Except as stated in HPI:  all other systems reviewed are Neg





Physical Exam


Narrative


GENERAL: well developed well nourished, obese


SKIN: Warm and dry. 


HEAD: Normocephalic.


EYES: No scleral icterus. No injection or drainage. 


NECK: Supple, trachea midline. No JVD or lymphadenopathy.


CARDIOVASCULAR: Regular rate and rhythm without murmurs, gallops, or rubs. 


RESPIRATORY: Breath sounds equal bilaterally. No accessory muscle use.


MUSCULOSKELETAL: No cyanosis, or edema. Right ankle- neg post/ant drawer, mild 

pain to passive ROM lateral and medial, no TTP to ankle. Right foot- mild TTP 

to lateral 5th MTP. no TTP to plantar fascia. FROM to toes. Neurovascularly 

intact.


BACK: Nontender without obvious deformity. No CVA tenderness.








Data


Data


Last Documented VS





Vital Signs








  Date Time  Temp Pulse Resp B/P (MAP) Pulse Ox O2 Delivery O2 Flow Rate FiO2


 


17 12:31        


 


17 10:31   18     


 


17 10:22 98.7 70   100   








Orders





 Orders


Foot, Limited (2vws) (17 )


Ankle, Limited (Ap&Lat) (17 )


Splint Or Brace Apply/Monitor (17 12:02)


Ed Discharge Order (17 12:03)


Crutches (17 12:06)








MDM


Medical Decision Making


Medical Screen Exam Complete:  Yes


Emergency Medical Condition:  Yes


Differential Diagnosis


right foot plantar fasciitis vs fracture vs strain vs sprain


right ankle sprain vs strain vs fracture


Narrative Course


27-year-old female presents to the emergency department complaining of right 

ankle and foot pain for approximately 1 week. States she turned to step and 

felt moderate, sharp pains along the lateral and medial aspect of the foot and 

ankle. Pain is reduced with rest. She has not used OTC medications or wraps for 

symptom relief. She started new job at [employer] in which she stands 

for several hours at a time and believes this may be why she has increased 

pain. She denies numbness, tingling, radiation of pain, weakness, crepitus. She 

has a history of 'fallen arches' and was previously placed in a boot for a 

couple of months. She has not followed up with her podiatrist. She otherwise 

does not have chronic medical conditions nor take chronic medications. 


Physical exam demonstrates multiple area of tenderness right foot without 

obvious pathology or trauma. No obvious pathology or trauma of right ankle. 

Neurovascularly intact.


Imaging studies: No acute process


Pt to follow up with podiatrist or ortho. Follow up with PCP.


Use compression and rest as needed. Crutches for symptom relief. 


OTC motrin and/or tylenol.





Diagnosis





 Primary Impression:  


 Right foot strain


 Qualified Codes:  S96.911A - Strain of unspecified muscle and tendon at ankle 

and foot level, right foot, initial encounter


 Additional Impression:  


 Right ankle sprain


 Qualified Codes:  S93.401A - Sprain of unspecified ligament of right ankle, 

initial encounter


Referrals:  


Podiatrist





Primary Care Physician





***Additional Instructions:  


Use Ace wrap for foot, elevate. May use motrin per package instruction. 


Follow up with your Primary Care physician within 2 days


Disposition:  01 DISCHARGE HOME


Condition:  Stable











Patricia Hansen 2017 10:44

## 2017-11-05 NOTE — RADRPT
EXAM DATE/TIME:  11/05/2017 10:59 

 

[facility] COMPARISON:     

No previous studies available for comparison.

 

                     

INDICATIONS :     

Injured foot 6 years ago- foot never healed. Pain.

                     

 

MEDICAL HISTORY :            

Broke foot 6 years ago.   

 

SURGICAL HISTORY :     

None.   

 

ENCOUNTER:     

Initial                                        

 

ACUITY:     

2 days      

 

PAIN SCORE:     

6/10

 

LOCATION:     

Right  Foot.

 

FINDINGS:     

Two view examination of the right foot demonstrates no soft tissue swelling, dislocation, or fracture
.  The calcaneus is intact.  Bony mineralization is normal.

 

CONCLUSION:     

Unremarkable limited examination of the right foot.  

 

 

 

 Noel Lopez MD on November 05, 2017 at 11:29           

Board Certified Radiologist.

 This report was verified electronically.

## 2017-11-05 NOTE — RADRPT
EXAM DATE/TIME:  11/05/2017 11:04 

 

[facility] COMPARISON:     

No previous studies available for comparison.

 

                     

INDICATIONS :     

Injured foot 6 years ago- foot never healed. Pain.

                     

 

MEDICAL HISTORY :            

Broke foot 6 years ago.   

 

SURGICAL HISTORY :     

None.   

 

ENCOUNTER:     

Initial                                        

 

ACUITY:     

2 days      

 

PAIN SCORE:     

6/10

 

LOCATION:     

Right  Ankle.

 

FINDINGS:     

Two view examination was performed of the right ankle.  The bony structures are in normal alignment. 
 No evidence of fracture, dislocation, or soft tissue swelling.  No radiopaque foreign bodies are see
n.  Bony mineralization is normal.

 

CONCLUSION:     

Unremarkable limited examination of the right.  

 

 

 

 

 Noel Lopez MD on November 05, 2017 at 11:41           

Board Certified Radiologist.

 This report was verified electronically.

## 2017-12-01 ENCOUNTER — HOSPITAL ENCOUNTER (EMERGENCY)
Dept: HOSPITAL 17 - NEPD | Age: 27
Discharge: HOME | End: 2017-12-01
Payer: MEDICAID

## 2017-12-01 VITALS
OXYGEN SATURATION: 99 % | SYSTOLIC BLOOD PRESSURE: 146 MMHG | HEART RATE: 98 BPM | RESPIRATION RATE: 16 BRPM | DIASTOLIC BLOOD PRESSURE: 85 MMHG | TEMPERATURE: 99.1 F

## 2017-12-01 VITALS
RESPIRATION RATE: 16 BRPM | HEART RATE: 86 BPM | SYSTOLIC BLOOD PRESSURE: 127 MMHG | OXYGEN SATURATION: 100 % | DIASTOLIC BLOOD PRESSURE: 74 MMHG

## 2017-12-01 VITALS — HEIGHT: 67 IN | WEIGHT: 275.58 LBS | BODY MASS INDEX: 43.25 KG/M2

## 2017-12-01 VITALS
DIASTOLIC BLOOD PRESSURE: 81 MMHG | RESPIRATION RATE: 16 BRPM | HEART RATE: 85 BPM | OXYGEN SATURATION: 100 % | SYSTOLIC BLOOD PRESSURE: 152 MMHG

## 2017-12-01 DIAGNOSIS — E11.9: ICD-10-CM

## 2017-12-01 DIAGNOSIS — R51: Primary | ICD-10-CM

## 2017-12-01 DIAGNOSIS — R42: ICD-10-CM

## 2017-12-01 DIAGNOSIS — J45.909: ICD-10-CM

## 2017-12-01 DIAGNOSIS — R01.1: ICD-10-CM

## 2017-12-01 DIAGNOSIS — F90.9: ICD-10-CM

## 2017-12-01 DIAGNOSIS — M06.9: ICD-10-CM

## 2017-12-01 DIAGNOSIS — D68.2: ICD-10-CM

## 2017-12-01 DIAGNOSIS — D64.9: ICD-10-CM

## 2017-12-01 PROCEDURE — 99284 EMERGENCY DEPT VISIT MOD MDM: CPT

## 2017-12-01 PROCEDURE — 96375 TX/PRO/DX INJ NEW DRUG ADDON: CPT

## 2017-12-01 PROCEDURE — 96374 THER/PROPH/DIAG INJ IV PUSH: CPT

## 2017-12-01 NOTE — PD
HPI


.


Headache


Chief Complaint:  Headache


Time Seen by Provider:  21:56


Travel History


International Travel<30 days:  No


Contact w/Intl Traveler<30days:  No


Traveled to known affect area:  No





History of Present Illness


HPI


Patient presents complaining with a headache for the last 5 days.  It is 

associated with episodic dizziness.  She states that she was out to dinner 

tonight when she stood up and became very dizzy.  She subsequently presented to 

us for evaluation.  She states that she has chronic headaches.  She states that 

she is not currently taking anything for the headaches because she has multiple 

drug allergies and has the need for anticoagulation.  She has Factor V Leiden 

mutation.  She initially rated her pain 6/10.  She now rates it 8/10.





PFSH


Past Medical History


Hx Anticoagulant Therapy:  Yes (XERALTO)


ADHD:  Yes


Anemia:  Yes


Arthritis:  Yes (RA IN RIGHT LEG AND HIP)


Asthma:  Yes


Blood Disorders:  Yes (Factor V , protein deficiency )


Anxiety:  Yes


Depression:  Yes


Heart Rhythm Problems:  Yes (HEART MURMUR)


Cardiovascular Problems:  Yes (MURMUR)


Chest Pain:  Yes


Diabetes:  Yes (PRE)


Patient Takes Glucophage:  No


Diminished Hearing:  No


Deep Vein Thrombosis:  Yes (LLE)


Gastrointestinal Disorders:  Yes


GERD:  Yes


Headaches:  Yes


Kidney Stones:  Yes


Musculoskeletal:  Yes (CHRONIC BACK PAIN)


Psychiatric:  Yes (PANIC DISORDER)


Immunizations Current:  Yes


Migraines:  Yes


Pneumonia:  Yes


Ulcer:  Yes


PNEUMOCCOCAL Vaccine (Year):  1


Pregnant?:  Not Pregnant


LMP:  CURRENT


Menopausal:  No


:  1


Para:  1


Miscarriage:  0


:  0


Ovarian Cysts:  Yes (tressa)


Tubal Ligation:  Yes





Past Surgical History


Gynecologic Surgery:  Yes (TUBAL)


Other Surgery:  Yes





Family History


Family Hypercholesterolemia:  Yes





Social History


Alcohol Use:  Yes (RARELY)


Tobacco Use:  No (QUIT IN )


Substance Use:  No





Allergies-Medications


(Allergen,Severity, Reaction):  


Coded Allergies:  


     aspirin (Verified  Allergy, Severe, coumadin therapy, 17)


     cefazolin (Verified  Allergy, Severe, Flushing, ITCHING, HIVES, 17)


     hydrocodone (Verified  Allergy, Severe, HIVES, VOMITING, 17)


     ibuprofen (Verified  Allergy, Severe, VOMITING, 17)


     lactose (Verified  Allergy, Severe, RASH ITCHING, 17)


     mold (Verified  Allergy, Severe, asthma attack, 17)


     mushroom (Verified  Allergy, Severe, throat swells, rash, 17)


     pineapple (Verified  Allergy, Severe, Rash, THROAT SWELLS, 17)


     sumatriptan (Verified  Allergy, Severe, CHEST PAIN, 17)


     acetaminophen (Verified  Allergy, Intermediate, rash, throat closed, 

asthmatic cough , 17)


     oxycodone (Verified  Allergy, Intermediate, rash, throat closed, asthmatic 

cough , 17)


Reported Meds & Prescriptions





Reported Meds & Active Scripts


Active


Diclofenac Topical 1% Gel 1 Applic TOPICAL QID


Reglan (Metoclopramide HCl) 10 Mg Tab 10 Mg PO DAILY


Hydrocortisone-Pramoxine Rectal 1-1% Cream 1 Applic RECTAL QID PRN


Zofran Odt (Ondansetron Odt) 4 Mg Tab 4 Mg SL Q6HR PRN


Flexeril (Cyclobenzaprine HCl) 10 Mg Tab 10 Mg PO TID


Proair Hfa 8.5 GM Inh (Albuterol Sulfate) 90 Mcg/Act Aer 2 Puff INH Q4-6H PRN


     108 mcg/actuation


Reported


Propranolol (Propranolol HCl) 80 Mg Tab 80 Mg PO Q12HR


Abilify (Aripiprazole) 2 Mg Tab 2 Mg PO DAILY


Lexapro (Escitalopram Oxalate) 20 Mg Tab 20 Mg PO DAILY


Xarelto (Rivaroxaban) 10 Mg Tab 10 Mg PO HS








Review of Systems


Except as stated in HPI:  all other systems reviewed are Neg


General / Constitutional:  No: Fever, Chills


Eyes:  Positive: Blurred Vision


HENT:  Positive: Headaches, Lightheadedness


Gastrointestinal:  No: Nausea, Vomiting





Physical Exam


Narrative


GENERAL: Awake and alert and in no acute distress.  She has a book in her lap.


SKIN: Warm and dry.


HEAD: Normocephalic/atraumatic.  Nontender.


EYES: Pupils are equal.  Extraocular movements are intact.


NECK: Normal range of motion.  Supple.


CARDIOVASCULAR: Regular rate and rhythm.


RESPIRATORY: Nonlabored respirations.


MUSCULOSKELETAL: Atraumatic.


NEUROLOGICAL: A and O 3.  Cranial nerves are intact.   strength are full 

and equal.  Finger-nose-finger exam is intact bilaterally.


PSYCHIATRIC: Appropriate mood and affect.





Data


Data


Last Documented VS





Vital Signs








  Date Time  Temp Pulse Resp B/P (MAP) Pulse Ox O2 Delivery O2 Flow Rate FiO2


 


17 22:03  86 16 127/74 (91) 100 Room Air  


 


17 20:38 99.1       








Orders





 Orders


Prochlorperazine Inj (Compazine Inj) (17 22:00)


Diphenhydramine Inj (Benadryl Inj) (17 22:00)


^ Saline Lock (17 21:59)


Prochlorperazine Inj (Compazine Inj) (17 22:15)


Diphenhydramine Inj (Benadryl Inj) (17 22:15)


^ Saline Lock (17 22:04)








MDM


Medical Decision Making


Medical Screen Exam Complete:  Yes


Emergency Medical Condition:  Yes


Differential Diagnosis


Differential diagnosis of headache includes but is not limited to migraine, 

muscle contraction headache, brain tumor, brain bleed


Narrative Course


This patient presents complaining with headache and dizziness.  I have ordered 

Compazine and Benadryl for her.





The patient reports she is feeling better.  She will be discharged to home.





Diagnosis





 Primary Impression:  


 Chronic headache


 Qualified Codes:  R51 - Headache


 Additional Impression:  


 Dizziness


Patient Instructions:  General Headache (ED), General Instructions


Disposition:  01 DISCHARGE HOME


Condition:  Stable











Aurea Lozano MD Dec 1, 2017 22:20

## 2018-05-07 ENCOUNTER — HOSPITAL ENCOUNTER (EMERGENCY)
Dept: HOSPITAL 17 - NEPC | Age: 28
LOS: 1 days | Discharge: HOME | End: 2018-05-08
Payer: MEDICAID

## 2018-05-07 VITALS
HEART RATE: 92 BPM | TEMPERATURE: 98.6 F | RESPIRATION RATE: 18 BRPM | DIASTOLIC BLOOD PRESSURE: 74 MMHG | OXYGEN SATURATION: 99 % | SYSTOLIC BLOOD PRESSURE: 144 MMHG

## 2018-05-07 VITALS
OXYGEN SATURATION: 100 % | HEART RATE: 83 BPM | DIASTOLIC BLOOD PRESSURE: 90 MMHG | SYSTOLIC BLOOD PRESSURE: 173 MMHG | RESPIRATION RATE: 18 BRPM | TEMPERATURE: 98.7 F

## 2018-05-07 VITALS — HEIGHT: 67 IN | WEIGHT: 293 LBS | BODY MASS INDEX: 45.99 KG/M2

## 2018-05-07 DIAGNOSIS — E66.01: ICD-10-CM

## 2018-05-07 DIAGNOSIS — R73.03: ICD-10-CM

## 2018-05-07 DIAGNOSIS — Z79.01: ICD-10-CM

## 2018-05-07 DIAGNOSIS — Z87.442: ICD-10-CM

## 2018-05-07 DIAGNOSIS — M06.9: ICD-10-CM

## 2018-05-07 DIAGNOSIS — R63.1: ICD-10-CM

## 2018-05-07 DIAGNOSIS — Z86.718: ICD-10-CM

## 2018-05-07 DIAGNOSIS — R10.9: Primary | ICD-10-CM

## 2018-05-07 DIAGNOSIS — Z87.440: ICD-10-CM

## 2018-05-07 PROCEDURE — 99284 EMERGENCY DEPT VISIT MOD MDM: CPT

## 2018-05-07 PROCEDURE — 85025 COMPLETE CBC W/AUTO DIFF WBC: CPT

## 2018-05-07 PROCEDURE — 80053 COMPREHEN METABOLIC PANEL: CPT

## 2018-05-07 PROCEDURE — 96374 THER/PROPH/DIAG INJ IV PUSH: CPT

## 2018-05-07 PROCEDURE — 83690 ASSAY OF LIPASE: CPT

## 2018-05-07 PROCEDURE — 81001 URINALYSIS AUTO W/SCOPE: CPT

## 2018-05-07 PROCEDURE — 84703 CHORIONIC GONADOTROPIN ASSAY: CPT

## 2018-05-08 LAB
ALBUMIN SERPL-MCNC: 3.8 GM/DL (ref 3.4–5)
ALP SERPL-CCNC: 74 U/L (ref 45–117)
ALT SERPL-CCNC: 30 U/L (ref 10–53)
AST SERPL-CCNC: 34 U/L (ref 15–37)
BACTERIA #/AREA URNS HPF: (no result) /HPF
BASOPHILS # BLD AUTO: 0.1 TH/MM3 (ref 0–0.2)
BASOPHILS NFR BLD: 0.8 % (ref 0–2)
BILIRUB SERPL-MCNC: 0.5 MG/DL (ref 0.2–1)
BUN SERPL-MCNC: 12 MG/DL (ref 7–18)
CALCIUM SERPL-MCNC: 8.8 MG/DL (ref 8.5–10.1)
CHLORIDE SERPL-SCNC: 108 MEQ/L (ref 98–107)
COLOR UR: YELLOW
CREAT SERPL-MCNC: 0.93 MG/DL (ref 0.5–1)
EOSINOPHIL # BLD: 0.1 TH/MM3 (ref 0–0.4)
EOSINOPHIL NFR BLD: 1.1 % (ref 0–4)
ERYTHROCYTE [DISTWIDTH] IN BLOOD BY AUTOMATED COUNT: 13.1 % (ref 11.6–17.2)
GFR SERPLBLD BASED ON 1.73 SQ M-ARVRAT: 72 ML/MIN (ref 89–?)
GLUCOSE SERPL-MCNC: 81 MG/DL (ref 74–106)
GLUCOSE UR STRIP-MCNC: (no result) MG/DL
HCO3 BLD-SCNC: 26 MEQ/L (ref 21–32)
HCT VFR BLD CALC: 39.7 % (ref 35–46)
HGB BLD-MCNC: 13.3 GM/DL (ref 11.6–15.3)
HGB UR QL STRIP: (no result)
KETONES UR STRIP-MCNC: (no result) MG/DL
LYMPHOCYTES # BLD AUTO: 4 TH/MM3 (ref 1–4.8)
LYMPHOCYTES NFR BLD AUTO: 35.1 % (ref 9–44)
MCH RBC QN AUTO: 29 PG (ref 27–34)
MCHC RBC AUTO-ENTMCNC: 33.4 % (ref 32–36)
MCV RBC AUTO: 86.7 FL (ref 80–100)
MONOCYTE #: 0.7 TH/MM3 (ref 0–0.9)
MONOCYTES NFR BLD: 6 % (ref 0–8)
MUCOUS THREADS #/AREA URNS LPF: (no result) /LPF
NEUTROPHILS # BLD AUTO: 6.5 TH/MM3 (ref 1.8–7.7)
NEUTROPHILS NFR BLD AUTO: 57 % (ref 16–70)
NITRITE UR QL STRIP: (no result)
PLATELET # BLD: 320 TH/MM3 (ref 150–450)
PMV BLD AUTO: 8.3 FL (ref 7–11)
PROT SERPL-MCNC: 8.3 GM/DL (ref 6.4–8.2)
RBC # BLD AUTO: 4.58 MIL/MM3 (ref 4–5.3)
SODIUM SERPL-SCNC: 140 MEQ/L (ref 136–145)
SP GR UR STRIP: 1.02 (ref 1–1.03)
SQUAMOUS #/AREA URNS HPF: 1 /HPF (ref 0–5)
URINE LEUKOCYTE ESTERASE: (no result)
WBC # BLD AUTO: 11.3 TH/MM3 (ref 4–11)

## 2018-05-08 NOTE — PD
HPI


Chief Complaint:  Flank/Kidney Pain


Time Seen by Provider:  23:40


Travel History


International Travel<30 days:  No


Contact w/Intl Traveler<30days:  No


Traveled to known affect area:  No





History of Present Illness


HPI


This is a 47-year-old female with a history of kidney stones or recurrent 

urinary tract infections presents emergency department for evaluation of lower 

back pain for a few months, she states that it worsened and she having some 

nausea and difficulty sleeping over the past week or so.  Denies any fever 

denies any cough congestion vaginal bleeding vaginal discharge or blood in the 

urine.  She states that he thinks he has a bilateral urinary tract infection.  

She states that she has not seen another physician for this as of yet.  Denies 

possibility for pregnancy.  She is also states been urinating more frequently 

and just not feeling well.  States he also has a history of prediabetes.  Also 

endorses polydipsia.  States symptoms are moderate, gradually worsening over 

the past week, context and associated signs and symptoms as above





PFSH


Past Medical History


Hx Anticoagulant Therapy:  Yes (XERALTO)


ADHD:  Yes


Anemia:  Yes


Arthritis:  Yes (RA IN RIGHT LEG AND HIP)


Asthma:  Yes


Blood Disorders:  Yes (Factor V , protein deficiency )


Anxiety:  Yes


Depression:  Yes


Heart Rhythm Problems:  Yes (HEART MURMUR)


Cardiovascular Problems:  Yes (MURMUR)


Chest Pain:  Yes


Diabetes:  Yes (PRE)


Patient Takes Glucophage:  No


Diminished Hearing:  No


Deep Vein Thrombosis:  Yes (LLE)


Gastrointestinal Disorders:  Yes


GERD:  Yes


Headaches:  Yes


Kidney Stones:  Yes


Musculoskeletal:  Yes (CHRONIC BACK PAIN)


Psychiatric:  Yes (PANIC DISORDER)


Immunizations Current:  Yes


Migraines:  Yes


Pneumonia:  Yes


Ulcer:  Yes


PNEUMOCCOCAL Vaccine (Year):  1


Pregnant?:  Not Pregnant


Menopausal:  No


:  1


Para:  1


Miscarriage:  0


:  0


Ovarian Cysts:  Yes (tressa)


Tubal Ligation:  Yes





Past Surgical History


Gynecologic Surgery:  Yes (TUBAL)


Other Surgery:  Yes





Family History


Family Hypercholesterolemia:  Yes





Social History


Alcohol Use:  Yes (RARELY)


Tobacco Use:  No (QUIT IN )


Substance Use:  No





Allergies-Medications


(Allergen,Severity, Reaction):  


Coded Allergies:  


     aspirin (Verified  Allergy, Severe, coumadin therapy, 18)


     cefazolin (Verified  Allergy, Severe, Flushing, ITCHING, HIVES, 18)


     hydrocodone (Verified  Allergy, Severe, HIVES, VOMITING, 18)


     ibuprofen (Verified  Allergy, Severe, VOMITING, 18)


     lactose (Verified  Allergy, Severe, RASH ITCHING, 18)


     mold (Verified  Allergy, Severe, asthma attack, 18)


     mushroom (Verified  Allergy, Severe, throat swells, rash, 18)


     pineapple (Verified  Allergy, Severe, Rash, THROAT SWELLS, 18)


     sumatriptan (Verified  Allergy, Severe, CHEST PAIN, 18)


     acetaminophen (Verified  Allergy, Intermediate, rash, throat closed, 

asthmatic cough , 18)


     oxycodone (Verified  Allergy, Intermediate, rash, throat closed, asthmatic 

cough , 18)


Reported Meds & Prescriptions





Reported Meds & Active Scripts


Active


Zofran (Ondansetron HCl) 4 Mg Tab 4 Mg PO Q12HR PRN


Flexeril (Cyclobenzaprine HCl) 10 Mg Tab 10 Mg PO TID


Diclofenac Topical 1% Gel 1 Applic TOPICAL QID


Hydrocortisone-Pramoxine Rectal 1-1% Cream 1 Applic RECTAL QID PRN


Proair Hfa 8.5 GM Inh (Albuterol Sulfate) 90 Mcg/Act Aer 2 Puff INH Q4-6H PRN


     108 mcg/actuation


Reported


Griselda (Levonorgestrel (Iud)) 14 Mcg/24 Hour (3 Years) Iud 13.5 Mg I-UTERINE ONCE


Oxcarbazepine 150 Mg Tab 150 Mg PO BID


Propranolol (Propranolol HCl) 80 Mg Tab 80 Mg PO Q12HR


Xarelto (Rivaroxaban) 10 Mg Tab 10 Mg PO HS








Review of Systems


Except as stated in HPI:  all other systems reviewed are Neg





Physical Exam


Narrative


GENERAL: Well-developed morbidly obese female in no obvious distress


SKIN: Focused skin assessment warm/dry.


HEAD: Atraumatic. Normocephalic. 


EYES: Pupils equal and round. No scleral icterus. No injection or drainage. 


ENT: No nasal bleeding or discharge.  Mucous membranes pink and moist.


NECK: Trachea midline. No JVD. 


CARDIOVASCULAR: Regular rate and rhythm.  No murmur appreciated.


RESPIRATORY: No accessory muscle use. Clear to auscultation. Breath sounds 

equal bilaterally. 


GASTROINTESTINAL: Abdomen soft, non-tender, nondistended. Hepatic and splenic 

margins not palpable.  No CVA tenderness, abdomen obese but soft, no tenderness.


MUSCULOSKELETAL: No obvious deformities. No clubbing.  No cyanosis.  No edema. 


NEUROLOGICAL: Awake and alert. No obvious cranial nerve deficits.  Motor 

grossly within normal limits. Normal speech.


PSYCHIATRIC: Appropriate mood and affect; insight and judgment normal.





Data


Data


Last Documented VS





Vital Signs








  Date Time  Temp Pulse Resp B/P (MAP) Pulse Ox O2 Delivery O2 Flow Rate FiO2


 


18 03:26        


 


18 23:39 98.7 83 18  100 Room Air  








Orders





 Orders


Urinalysis - C+S If Indicated (18 23:41)


Ed Urine Pregnancytest Poc (18 23:41)


Complete Blood Count With Diff (18 00:29)


Comprehensive Metabolic Panel (18 00:29)


Lipase (18 00:29)


Iv Access Insert/Monitor (18 00:29)


Ecg Monitoring (18 00:29)


Oximetry (18 00:29)


Sodium Chloride 0.9% Flush (Ns Flush) (18 00:30)


Ketorolac Inj (Toradol Inj) (18 00:30)


Ed Discharge Order (18 03:17)





Labs





Laboratory Tests








Test


  18


23:50 18


01:05


 


Urine Color YELLOW  


 


Urine Turbidity CLEAR  


 


Urine pH 5.0  


 


Urine Specific Gravity 1.024  


 


Urine Protein NEG mg/dL  


 


Urine Glucose (UA) NEG mg/dL  


 


Urine Ketones NEG mg/dL  


 


Urine Occult Blood NEG  


 


Urine Nitrite NEG  


 


Urine Bilirubin NEG  


 


Urine Urobilinogen


  LESS THAN 2.0


MG/DL 


 


 


Urine Leukocyte Esterase NEG  


 


Urine RBC 1 /hpf  


 


Urine WBC


  LESS THAN 1


/hpf 


 


 


Urine Squamous Epithelial


Cells 1 /hpf 


  


 


 


Urine Bacteria RARE /hpf  


 


Urine Mucus FEW /lpf  


 


Microscopic Urinalysis Comment


  CULT NOT


INDICATED 


 


 


White Blood Count  11.3 TH/MM3 


 


Red Blood Count  4.58 MIL/MM3 


 


Hemoglobin  13.3 GM/DL 


 


Hematocrit  39.7 % 


 


Mean Corpuscular Volume  86.7 FL 


 


Mean Corpuscular Hemoglobin  29.0 PG 


 


Mean Corpuscular Hemoglobin


Concent 


  33.4 % 


 


 


Red Cell Distribution Width  13.1 % 


 


Platelet Count  320 TH/MM3 


 


Mean Platelet Volume  8.3 FL 


 


Neutrophils (%) (Auto)  57.0 % 


 


Lymphocytes (%) (Auto)  35.1 % 


 


Monocytes (%) (Auto)  6.0 % 


 


Eosinophils (%) (Auto)  1.1 % 


 


Basophils (%) (Auto)  0.8 % 


 


Neutrophils # (Auto)  6.5 TH/MM3 


 


Lymphocytes # (Auto)  4.0 TH/MM3 


 


Monocytes # (Auto)  0.7 TH/MM3 


 


Eosinophils # (Auto)  0.1 TH/MM3 


 


Basophils # (Auto)  0.1 TH/MM3 


 


CBC Comment  DIFF FINAL 


 


Differential Comment   


 


Blood Urea Nitrogen  12 MG/DL 


 


Creatinine  0.93 MG/DL 


 


Random Glucose  81 MG/DL 


 


Total Protein  8.3 GM/DL 


 


Albumin  3.8 GM/DL 


 


Calcium Level  8.8 MG/DL 


 


Alkaline Phosphatase  74 U/L 


 


Aspartate Amino Transf


(AST/SGOT) 


  34 U/L 


 


 


Alanine Aminotransferase


(ALT/SGPT) 


  30 U/L 


 


 


Total Bilirubin  0.5 MG/DL 


 


Sodium Level  140 MEQ/L 


 


Potassium Level  4.5 MEQ/L 


 


Chloride Level  108 MEQ/L 


 


Carbon Dioxide Level  26.0 MEQ/L 


 


Anion Gap  6 MEQ/L 


 


Estimat Glomerular Filtration


Rate 


  72 ML/MIN 


 


 


Lipase  102 U/L 











MDM


Medical Decision Making


Medical Screen Exam Complete:  Yes


Emergency Medical Condition:  Yes


Differential Diagnosis


Back pain, UTI, pyelonephritis, kidney stone possible but less likely, 

endometriosis, acute abdomen unlikely, cholecystitis, pancreatitis.


Narrative Course


Patient room to the emergency department, has benign abdomen, basic labs are 

reassuring including a CBC CMP lipase and UA.  UPT negative peer.  With a 

benign abdomen is no indication for CAT scanning at this time.  Discussed with 

the patient who on reassessment is sleeping soundly; need follow-up with 

gastroenterology and primary care physician.  Is no indication further workup 

at this time.  Should pain is well under control and she stable for discharge.  

Discussed that no definitive cause of her pain is been established





Diagnosis





 Primary Impression:  


 Abdominal pain


 Additional Impression:  


 back pain


Referrals:  


[facility]


Disposition:  01 DISCHARGE HOME


Condition:  Stable











Hubert Aldridge MD May 8, 2018 00:32